# Patient Record
Sex: FEMALE | Race: WHITE | Employment: OTHER | URBAN - METROPOLITAN AREA
[De-identification: names, ages, dates, MRNs, and addresses within clinical notes are randomized per-mention and may not be internally consistent; named-entity substitution may affect disease eponyms.]

---

## 2017-04-10 PROBLEM — M47.817 LUMBOSACRAL SPONDYLOSIS WITHOUT MYELOPATHY: Status: ACTIVE | Noted: 2017-04-10

## 2017-04-10 PROBLEM — M51.379 DEGENERATION OF LUMBAR OR LUMBOSACRAL INTERVERTEBRAL DISC: Status: ACTIVE | Noted: 2017-04-10

## 2017-04-10 PROBLEM — M51.26 DISPLACEMENT OF LUMBAR INTERVERTEBRAL DISC WITHOUT MYELOPATHY: Status: ACTIVE | Noted: 2017-04-10

## 2017-04-10 PROBLEM — M51.379 DEGENERATION OF LUMBAR OR LUMBOSACRAL INTERVERTEBRAL DISC: Chronic | Status: ACTIVE | Noted: 2017-04-10

## 2017-04-10 PROBLEM — M54.16 LUMBAR RADICULOPATHY: Chronic | Status: ACTIVE | Noted: 2017-04-10

## 2017-04-10 PROBLEM — M51.37 DEGENERATION OF LUMBAR OR LUMBOSACRAL INTERVERTEBRAL DISC: Chronic | Status: ACTIVE | Noted: 2017-04-10

## 2017-04-10 PROBLEM — M51.37 DEGENERATION OF LUMBAR OR LUMBOSACRAL INTERVERTEBRAL DISC: Status: ACTIVE | Noted: 2017-04-10

## 2017-04-10 PROBLEM — M47.817 LUMBOSACRAL SPONDYLOSIS WITHOUT MYELOPATHY: Chronic | Status: ACTIVE | Noted: 2017-04-10

## 2017-04-10 PROBLEM — M51.26 DISPLACEMENT OF LUMBAR INTERVERTEBRAL DISC WITHOUT MYELOPATHY: Chronic | Status: ACTIVE | Noted: 2017-04-10

## 2017-04-10 PROBLEM — M54.16 LUMBAR RADICULOPATHY: Status: ACTIVE | Noted: 2017-04-10

## 2022-12-28 DIAGNOSIS — D32.9 MENINGIOMA (HCC): Primary | ICD-10-CM

## 2022-12-30 RX ORDER — QUETIAPINE FUMARATE 25 MG/1
12.5 TABLET, FILM COATED ORAL NIGHTLY
COMMUNITY
Start: 2022-12-12

## 2022-12-30 RX ORDER — METFORMIN HYDROCHLORIDE 500 MG/1
500 TABLET, EXTENDED RELEASE ORAL 2 TIMES DAILY
COMMUNITY
Start: 2022-10-24

## 2022-12-30 RX ORDER — ALBUTEROL SULFATE 90 UG/1
2 AEROSOL, METERED RESPIRATORY (INHALATION) EVERY 4 HOURS PRN
COMMUNITY
Start: 2021-01-11 | End: 2023-01-09

## 2022-12-30 RX ORDER — FLUTICASONE PROPIONATE 50 MCG
2 SPRAY, SUSPENSION (ML) NASAL NIGHTLY
COMMUNITY
Start: 2022-12-05

## 2022-12-30 RX ORDER — FLUTICASONE FUROATE, UMECLIDINIUM BROMIDE AND VILANTEROL TRIFENATATE 100; 62.5; 25 UG/1; UG/1; UG/1
POWDER RESPIRATORY (INHALATION)
COMMUNITY
Start: 2022-11-21

## 2022-12-30 RX ORDER — DOXAZOSIN 2 MG/1
TABLET ORAL
COMMUNITY
Start: 2022-10-21

## 2022-12-30 RX ORDER — LATANOPROST 50 UG/ML
1 SOLUTION/ DROPS OPHTHALMIC NIGHTLY
COMMUNITY

## 2023-01-10 ENCOUNTER — HOSPITAL ENCOUNTER (OUTPATIENT)
Dept: RADIATION ONCOLOGY | Age: 78
Discharge: HOME | End: 2023-01-10
Payer: MEDICARE

## 2023-01-10 ENCOUNTER — ANESTHESIA (OUTPATIENT)
Dept: RADIATION ONCOLOGY | Age: 78
End: 2023-01-10

## 2023-01-10 ENCOUNTER — ANESTHESIA EVENT (OUTPATIENT)
Dept: RADIATION ONCOLOGY | Age: 78
End: 2023-01-10

## 2023-01-10 ENCOUNTER — HOSPITAL ENCOUNTER (OUTPATIENT)
Dept: CT IMAGING | Age: 78
Discharge: HOME | End: 2023-01-10
Payer: MEDICARE

## 2023-01-10 ENCOUNTER — HOSPITAL ENCOUNTER (OUTPATIENT)
Dept: MRI IMAGING | Age: 78
Discharge: HOME | End: 2023-01-10
Payer: MEDICARE

## 2023-01-10 VITALS
BODY MASS INDEX: 30.88 KG/M2 | OXYGEN SATURATION: 97 % | TEMPERATURE: 96.9 F | HEIGHT: 60 IN | SYSTOLIC BLOOD PRESSURE: 116 MMHG | RESPIRATION RATE: 18 BRPM | WEIGHT: 157.3 LBS | HEART RATE: 68 BPM | DIASTOLIC BLOOD PRESSURE: 51 MMHG

## 2023-01-10 LAB
ALBUMIN SERPL-MCNC: 4.2 G/DL (ref 3.4–5)
ANION GAP SERPL CALCULATED.3IONS-SCNC: 13 MMOL/L (ref 3–16)
BUN BLDV-MCNC: 18 MG/DL (ref 7–20)
CALCIUM SERPL-MCNC: 9.8 MG/DL (ref 8.3–10.6)
CHLORIDE BLD-SCNC: 101 MMOL/L (ref 99–110)
CO2: 24 MMOL/L (ref 21–32)
CREAT SERPL-MCNC: 0.7 MG/DL (ref 0.6–1.2)
GFR SERPL CREATININE-BSD FRML MDRD: >60 ML/MIN/{1.73_M2}
GLUCOSE BLD-MCNC: 184 MG/DL (ref 70–99)
GLUCOSE BLD-MCNC: 197 MG/DL (ref 70–99)
PERFORMED ON: ABNORMAL
PHOSPHORUS: 3.1 MG/DL (ref 2.5–4.9)
POTASSIUM SERPL-SCNC: 4.7 MMOL/L (ref 3.5–5.1)
SODIUM BLD-SCNC: 138 MMOL/L (ref 136–145)

## 2023-01-10 PROCEDURE — 2580000003 HC RX 258: Performed by: NEUROLOGICAL SURGERY

## 2023-01-10 PROCEDURE — 36415 COLL VENOUS BLD VENIPUNCTURE: CPT

## 2023-01-10 PROCEDURE — 77371 SRS MULTISOURCE: CPT

## 2023-01-10 PROCEDURE — 77295 3-D RADIOTHERAPY PLAN: CPT

## 2023-01-10 PROCEDURE — A9579 GAD-BASE MR CONTRAST NOS,1ML: HCPCS | Performed by: NEUROLOGICAL SURGERY

## 2023-01-10 PROCEDURE — 77336 RADIATION PHYSICS CONSULT: CPT

## 2023-01-10 PROCEDURE — 2500000003 HC RX 250 WO HCPCS: Performed by: NEUROLOGICAL SURGERY

## 2023-01-10 PROCEDURE — 7100000010 HC PHASE II RECOVERY - FIRST 15 MIN

## 2023-01-10 PROCEDURE — 80069 RENAL FUNCTION PANEL: CPT

## 2023-01-10 PROCEDURE — 3700000001 HC ADD 15 MINUTES (ANESTHESIA)

## 2023-01-10 PROCEDURE — 6360000004 HC RX CONTRAST MEDICATION: Performed by: NEUROLOGICAL SURGERY

## 2023-01-10 PROCEDURE — 70553 MRI BRAIN STEM W/O & W/DYE: CPT

## 2023-01-10 PROCEDURE — 3700000000 HC ANESTHESIA ATTENDED CARE

## 2023-01-10 PROCEDURE — 7100000011 HC PHASE II RECOVERY - ADDTL 15 MIN

## 2023-01-10 PROCEDURE — 77300 RADIATION THERAPY DOSE PLAN: CPT

## 2023-01-10 PROCEDURE — 6370000000 HC RX 637 (ALT 250 FOR IP): Performed by: NEUROLOGICAL SURGERY

## 2023-01-10 PROCEDURE — 77334 RADIATION TREATMENT AID(S): CPT

## 2023-01-10 PROCEDURE — 70470 CT HEAD/BRAIN W/O & W/DYE: CPT

## 2023-01-10 PROCEDURE — 6360000002 HC RX W HCPCS: Performed by: NEUROLOGICAL SURGERY

## 2023-01-10 RX ORDER — BUPIVACAINE HYDROCHLORIDE 5 MG/ML
30 INJECTION, SOLUTION EPIDURAL; INTRACAUDAL ONCE
Status: COMPLETED | OUTPATIENT
Start: 2023-01-10 | End: 2023-01-10

## 2023-01-10 RX ORDER — 0.9 % SODIUM CHLORIDE 0.9 %
500 INTRAVENOUS SOLUTION INTRAVENOUS ONCE
Status: COMPLETED | OUTPATIENT
Start: 2023-01-10 | End: 2023-01-10

## 2023-01-10 RX ORDER — PROPOFOL 10 MG/ML
INJECTION, EMULSION INTRAVENOUS PRN
Status: DISCONTINUED | OUTPATIENT
Start: 2023-01-10 | End: 2023-01-10 | Stop reason: SDUPTHER

## 2023-01-10 RX ORDER — LIDOCAINE HYDROCHLORIDE 10 MG/ML
30 INJECTION, SOLUTION EPIDURAL; INFILTRATION; INTRACAUDAL; PERINEURAL ONCE
Status: COMPLETED | OUTPATIENT
Start: 2023-01-10 | End: 2023-01-10

## 2023-01-10 RX ORDER — LIDOCAINE HYDROCHLORIDE 20 MG/ML
INJECTION, SOLUTION EPIDURAL; INFILTRATION; INTRACAUDAL; PERINEURAL PRN
Status: DISCONTINUED | OUTPATIENT
Start: 2023-01-10 | End: 2023-01-10 | Stop reason: SDUPTHER

## 2023-01-10 RX ORDER — ACETAMINOPHEN 325 MG/1
650 TABLET ORAL EVERY 4 HOURS PRN
Status: DISCONTINUED | OUTPATIENT
Start: 2023-01-10 | End: 2023-01-11 | Stop reason: HOSPADM

## 2023-01-10 RX ORDER — LORAZEPAM 2 MG/ML
1 INJECTION INTRAMUSCULAR EVERY 6 HOURS PRN
Status: DISCONTINUED | OUTPATIENT
Start: 2023-01-10 | End: 2023-01-11 | Stop reason: HOSPADM

## 2023-01-10 RX ORDER — BENZONATATE 100 MG/1
100 CAPSULE ORAL ONCE
Status: COMPLETED | OUTPATIENT
Start: 2023-01-10 | End: 2023-01-10

## 2023-01-10 RX ORDER — DEXAMETHASONE SODIUM PHOSPHATE 4 MG/ML
4 INJECTION, SOLUTION INTRA-ARTICULAR; INTRALESIONAL; INTRAMUSCULAR; INTRAVENOUS; SOFT TISSUE ONCE
Status: COMPLETED | OUTPATIENT
Start: 2023-01-10 | End: 2023-01-10

## 2023-01-10 RX ORDER — SODIUM BICARBONATE 42 MG/ML
1.5 INJECTION, SOLUTION INTRAVENOUS ONCE
Status: COMPLETED | OUTPATIENT
Start: 2023-01-10 | End: 2023-01-10

## 2023-01-10 RX ORDER — ONDANSETRON 2 MG/ML
4 INJECTION INTRAMUSCULAR; INTRAVENOUS ONCE
Status: COMPLETED | OUTPATIENT
Start: 2023-01-10 | End: 2023-01-10

## 2023-01-10 RX ORDER — BACITRACIN, NEOMYCIN, POLYMYXIN B 400; 3.5; 5 [USP'U]/G; MG/G; [USP'U]/G
OINTMENT TOPICAL ONCE
Status: COMPLETED | OUTPATIENT
Start: 2023-01-10 | End: 2023-01-10

## 2023-01-10 RX ORDER — SODIUM CHLORIDE 9 MG/ML
INJECTION, SOLUTION INTRAVENOUS CONTINUOUS PRN
Status: DISCONTINUED | OUTPATIENT
Start: 2023-01-10 | End: 2023-01-10 | Stop reason: SDUPTHER

## 2023-01-10 RX ADMIN — PROPOFOL 30 MG: 10 INJECTION, EMULSION INTRAVENOUS at 07:59

## 2023-01-10 RX ADMIN — LIDOCAINE HYDROCHLORIDE 30 ML: 10 INJECTION, SOLUTION EPIDURAL; INFILTRATION; INTRACAUDAL; PERINEURAL at 08:10

## 2023-01-10 RX ADMIN — LIDOCAINE HYDROCHLORIDE 100 MG: 20 INJECTION, SOLUTION EPIDURAL; INFILTRATION; INTRACAUDAL; PERINEURAL at 07:55

## 2023-01-10 RX ADMIN — PROPOFOL 20 MG: 10 INJECTION, EMULSION INTRAVENOUS at 07:57

## 2023-01-10 RX ADMIN — DEXAMETHASONE SODIUM PHOSPHATE 4 MG: 4 INJECTION, SOLUTION INTRAMUSCULAR; INTRAVENOUS at 08:10

## 2023-01-10 RX ADMIN — PROPOFOL 50 MG: 10 INJECTION, EMULSION INTRAVENOUS at 07:55

## 2023-01-10 RX ADMIN — SODIUM CHLORIDE: 9 INJECTION, SOLUTION INTRAVENOUS at 07:52

## 2023-01-10 RX ADMIN — GADOTERIDOL 15 ML: 279.3 INJECTION, SOLUTION INTRAVENOUS at 10:01

## 2023-01-10 RX ADMIN — ONDANSETRON 4 MG: 2 INJECTION INTRAMUSCULAR; INTRAVENOUS at 06:37

## 2023-01-10 RX ADMIN — SODIUM CHLORIDE 500 ML: 9 INJECTION, SOLUTION INTRAVENOUS at 07:30

## 2023-01-10 RX ADMIN — BENZONATATE 100 MG: 100 CAPSULE ORAL at 11:47

## 2023-01-10 RX ADMIN — SODIUM BICARBONATE 1.5 MEQ: 42 INJECTION, SOLUTION INTRAVENOUS at 08:10

## 2023-01-10 RX ADMIN — BACITRACIN, NEOMYCIN, POLYMYXIN B: 400; 3.5; 5 OINTMENT TOPICAL at 14:03

## 2023-01-10 RX ADMIN — IOPAMIDOL 75 ML: 755 INJECTION, SOLUTION INTRAVENOUS at 09:03

## 2023-01-10 RX ADMIN — PROPOFOL 50 MG: 10 INJECTION, EMULSION INTRAVENOUS at 08:02

## 2023-01-10 RX ADMIN — BUPIVACAINE HYDROCHLORIDE 150 MG: 5 INJECTION, SOLUTION EPIDURAL; INTRACAUDAL; PERINEURAL at 08:10

## 2023-01-10 RX ADMIN — PROPOFOL 50 MG: 10 INJECTION, EMULSION INTRAVENOUS at 08:06

## 2023-01-10 ASSESSMENT — PAIN DESCRIPTION - ORIENTATION: ORIENTATION: ANTERIOR;POSTERIOR

## 2023-01-10 ASSESSMENT — PAIN DESCRIPTION - DESCRIPTORS: DESCRIPTORS: PATIENT UNABLE TO DESCRIBE

## 2023-01-10 ASSESSMENT — PAIN DESCRIPTION - LOCATION: LOCATION: HEAD

## 2023-01-10 NOTE — PROGRESS NOTES
0828  Patient received MAC under the supervision of Dr. Josette Munroe. This RN was present throughout [place] [address] and assumed care from anesthesia for Phase II recovery. The patient is awake and breathing easily. Patient denies pain. See Flow Sheet for vitals and Dionne Score.     Pedro Garrett RN

## 2023-01-10 NOTE — PROGRESS NOTES
1240  Patient brought to [facility] suite and placed on treatment couch. Patient instructed to perform ankle pumps during treatment. AV monitoring in place and verified verbally with patient from console. Continuous SpO2 and pulse monitor visible and monitored by this RN.      Mireille Beavers RN

## 2023-01-10 NOTE — H&P
The patient was seen and examined. There have been no changes in the patient's condition since the history and physical.    I reviewed the benefits, risks, and alternatives and the patient consents to the procedure. We will obtain a CBCT with markers to confirm the location of the left frontal craniotomy vis-a-vis the fixation pins. Gasper Guerrero MD

## 2023-01-10 NOTE — ANESTHESIA POSTPROCEDURE EVALUATION
Department of Anesthesiology  Postprocedure Note    Patient: Florence Hernandez  MRN: [id number]  Birthdate: [birth year]  Date of evaluation: 1/10/2023      Procedure Summary     Date: 01/10/23 Room / Location: [facility]    Anesthesia Start: 0752 Anesthesia Stop: 1570    Procedure: GAMMAKNIFE W ANESTHESIA Diagnosis:     Scheduled Providers:  Responsible Provider: Ashley Mcmullen DO    Anesthesia Type: MAC ASA Status: 3          Anesthesia Type: No value filed.     Dionne Phase I:      Dionne Phase II:        Anesthesia Post Evaluation    Patient location during evaluation: PACU  Patient participation: complete - patient participated  Level of consciousness: awake and alert  Pain score: 0  Airway patency: patent  Nausea & Vomiting: no nausea and no vomiting  Complications: no  Cardiovascular status: hemodynamically stable  Respiratory status: acceptable  Hydration status: stable

## 2023-01-10 NOTE — PROGRESS NOTES
18  Gamma Knife Frame Placement Time Out     1. Patient states name and birthdate correctly? Yes    2. Procedure listed on consent:  G-Frame placement and Gamma Knife radiosurgery for   Left Meningioma    3. Is this the correct procedure? Yes    4. Are the consents signed? Yes    5. Is this a trigeminal neuralgia case? No    -If yes, what side are we treating? N/A    -If yes, is the side marked for laterality? N/A    6. Have films been reviewed today? Yes    7. Has the interim History and Physical form been completed? yes    8. Has the neurosurgeon reviewed the [facility] RN Pre-Procedure Checklist?  Yes    9. FEMALES ONLY: Does the patient [facility] a [facility] [address]? no     -If yes, the result was:  na    10. Are all present in agreement?   Yes    Those present for time out:  Dr. Alphonse Suresh RN, Cecilio Quezada RN

## 2023-01-10 NOTE — PROGRESS NOTES
Nadine 58    1. Patient states name and birthdate correctly? Yes    2. Procedure listed on consent Stereotactic Radiosurgery, Gamma Knife for Left Meningioma    3. Is this the correct procedure? Yes    4. Is this a trigeminal neuralgia case? No    -If yes, what side are we treating? N/A    -If yes, is the side marked for laterality? N/A    5. Has the final radiologist report been reviewed? yes    6. Does the patient require Keppra prior to treatment delivery? yes    7. Does the patient require Ativan prior to treatment delivery? N/A    8. Are all present in agreement? Yes    9.  Those present for time out:  Dr. Yuni Martinez RN, Megan Whitlock RN

## 2023-01-10 NOTE — DISCHARGE INSTRUCTIONS
GAMMA KNIFE POST-PROCEDURE INSTRUCTIONS    You may gently wash your hair and face two days after your procedure. Be gentle and do not rub the pin sites. Pat areas dry. WHAT TO DO IF YOU EXPERIENCE BLEEDING AT THE PIN SITES:  Remove any steri strips or band-aids that may be in place at the site that is bleeding. Using the gauze pads given to you by the [facility] RN, apply direct, one finger pressure continuously for 5 minutes. After 5 minutes inspect the site for bleeding. If the bleeding continues, repeat holding pressure, this time for 10 minutes. After 10 minutes, inspect the site again. If bleeding continues, proceed to the nearest emergency room for placement of a suture (stitch). If you have sutures (stitches) in place, those will dissolve on their own in about 7-10 days. You may shower with them, but keep them clean and dry otherwise. Avoid hair spray, mousse, gels, hair color, permanent solutions, or any other products that could cause irritation until your pin sites heal.  You may notice blood-tinged water when washing your hair. This may be dried blood in your hair from the application of the head frame. Swelling above the eyes may occur within 48 hours of surgery. You may apply cool compresses to your forehead if swelling occurs. Some patients experiences tingling or temporary numbness above the pin sites. You may return to work or school after 24 hours if you feel well enough. You should resume all of your regular activities unless the physician has instructed you otherwise. You may resume your usual diet right away. You have undergone a procedure with sedation. You have been given medicines that affect your judgment or impair your ability to operate heavy machines and automobiles. You may not drive for 24 hours after your procedure. Please see the attached information regarding Sedation.   If you develop any signs of infection (redness, swelling, drainage or irritation at pin sites, fever >101 F for more than 24 hours), call Dr. Stephanie Reyes office at [phone]. If you have received contrast for your CT scan and you are taking Metformin, you must not take your Metformin for the next 48 hours. Follow up with your physician as directed. Please contact Dr. Oliver Nolasco at [phone] with any new vision changes, balance problems, numbness/tingling, or severe headache. If you have an emergent concern and need to be seen by a physician immediately, please go to [facility] emergency room. Steroid taper:  Complete Medrol Dose Pack as prescribed. Continue taking your Keppra as prescribed until 3 days after Medrol dose Pack is complete. On March 15th start taking 1-250mg Keppra 1 time a day for 1 week, last day to take will be January 22nd. Take Blood Glucose levels 3 times a day while taking Medrol Dose Pack. If levels are over 200 please follow-up with Primary Care Physician. If you have any questions during regular business hours, call the [facility] nurse at [phone]. For questions during off-business hours and on weekends, contact Dr. Stephanie Reyes office directly at [phone]. For Jose Davis or Robina call [phone].

## 2023-01-10 NOTE — PROGRESS NOTES
64  Gamma Knife RN Pre-Procedure Checklist     1. Has the patient ever had any surgery on the head or neck? Yes    10/18/2011 by Dr. Caron Lei    -If yes, is the surgery site marked? yes    2. Has the patient ever received radiation treatment to the head or neck? No      -If yes, is the treatment summary and/or disk of treatment plan available? N/A      -If the patient has had previous Gamma Knife radiosurgery has the most recent imaging been reviewed in the Gamma Plan? N/A      3. Has the patient received chemotherapy or immunotherapy in the past month? No      -If yes, list the agent and date of last treatment. N/A    4. Is patient [de-identified] or older? No      -If yes, using all aluminum pins? Yes      5. List the procedure-specific medications taken by the patient this morning:   Keppra 250mg   Medrol Dose    6. What is the patients GFR? 88    -For GFR 0-30 => no contrast at MRI    -For GFR 31-59 => single dose contrast at MRI    -For GFR >60 => double dose contrast at MRI    7. IF FEMALE: Does the patient require a pregnancy test per [address]?   no   -If yes, the result is: na    8. What is the patient's baseline CO2? 30    9.   [facility]

## 2023-01-10 NOTE — OP NOTE
1 [facility]  PATIENT NAME:   Vijay Moreno   MR #:  [id number]  YOB: 1945   ACCOUNT #:  [de-identified]  SURGEON:  Roseanne Jones MD   ADMIT DATE:  1/10/2023  SERVICE:  Neurosurgery  DICTATED BY: Roseanne Jones MD   SURGERY DATE:  1/10/2023           OPERATIVE REPORT       PREOPERATIVE DIAGNOSIS:     1. Left clinoidal meningioma     POSTOPERATIVE DIAGNOSIS:     1. Same    PROCEDURE(S) PERFORMED:    1. Placement of stereotactic head frame   2. Gamma Knife radiosurgery for left clinoidal meningioma    NEUROSURGEON: Roseanne Jones MD       RADIATION ONCOLOGIST: Kelli Aquino MD    ANESTHESIA: Moderate sedation    COMPLICATIONS: None    INDICATIONS: This is a 76-year-old woman with a left clinoidal meningioma (WHO I, Ki-67 1-2%) who underwent craniotomy and radical resection on 10/18/11 by Dr. Esau Simpson. The patient has been followed expectantly and a recent MRI scan showed enlargement of the tumor remnant. We discussed various treatment options but ultimately recommended Gamma Knife radiosurgery. The patient was aware of the potential benefits in terms of tumor control and the possible risks including (but not limited to): pin site bleeding/swelling/infection, visual loss (1%), hormone disturbance (25%), brainstem swelling, seizures, bleeding, new neurological symptoms, and/or radiation injury of the brain. PERFORMANCE STATUS: 80%    SYSTEMIC DISEASE: Not applicable    DETAILS OF PROCEDURE: The four pin sites were cleaned with Chloraprep and injected with a mixture of Lidocaine 1%, Marcaine 0.5%, and sodium bicarbonate. The stereotactic head frame was secured with titanium screws. The patient underwent a stereotactic MRI scan with contrast. These images were sent over the network to the [address] [place] [address]. The targets and critical structures were contoured.  An optimal treatment plan was developed by the neurosurgeon, radiation oncologist, and medical physicist. All critical structure constraints were fulfilled. The patient then underwent Gamma Knife radiosurgery using the following parameters:    Target Size Volume Shape Shots Dose Isodose Lt OT Lt OT Lt OT    cm cc   Gy % Max Point 0.001 cc 0.005   Left clinoidal 2.20 1.22 Oval 12 13 50 11.9 Gy 9.8 Gy 9.1 Gy     The left clinoidal meningioma is complex based on location within 5 mm of the left optic nerve. The patient tolerated the procedure without difficulty and the stereotactic frame was removed uneventfully. The patient was instructed on pin site care and medications.     SPECIMENS REMOVED: None    ESTIMATED BLOOD LOSS: None    TOTAL TIME SPENT WITH PATIENT: In conformance with CMS regulations, I affirm that I was present for the entire neurosurgical portion of the procedure including stereotactic frame placement, target definition, development of the treatment plan, treatment delivery, and stereotactic frame removal.     Asif Glynn MD

## 2023-01-10 NOTE — PROGRESS NOTES
1345  After Gamma Knife procedure, the G-frame was removed by Leona Trotter RN and Alma Delia Bustamante RN and fixation pin sites were observed for bleeding. None was noted. Pin sites were cleaned with alcohol and povidone-iodine. Dr. Hillary Hayes then placed sutures at all four pin sites. 1400  Patient met Phase II discharge criteria. Baseline neurological status unchanged. See discharge Dionne score and vitals. 1415  Discharge instructions were reviewed with patient and daughter who verbalized understanding using teach back method. A written copy of the instructions along with a steroid taper calendar was also given. Patient has follow up appointments scheduled. 1434  Patient was accompanied to transportation by this RN.     Leona Trotter RN

## 2023-01-10 NOTE — PROGRESS NOTES
5631  Patient arrived to [address] [place] [address] Way alert and oriented x 4 with  and daughter. Patient is neurologically intact. PIV established without difficulty. Initial vitals WNL, and patient denies pain.         Aster Riddle RN

## 2023-01-10 NOTE — ANESTHESIA PRE PROCEDURE
Department of Anesthesiology  Preprocedure Note       Name:  Krystina Mancini   Age:  65 y.o.  :  1945                                          MRN:  [id number]         Date:  1/10/2023      Surgeon: * No surgeons listed *    Procedure: * No procedures listed *    Medications prior to admission:   Prior to Admission medications    Medication Sig Start Date End Date Taking? Authorizing Provider   fluticasone (FLONASE) 50 MCG/ACT nasal spray 2 sprays by Nasal route nightly 22  Yes Historical Provider, MD   metFORMIN (GLUCOPHAGE-XR) 500 MG extended release tablet Take 500 mg by mouth 2 times daily 10/24/22  Yes Historical Provider, MD   QUEtiapine (SEROQUEL) 25 MG tablet 12.5 mg nightly 22  Yes Historical Provider, MD   doxazosin (CARDURA) 2 MG tablet  10/21/22   Historical Provider, MD Tayler Kingston [phone] MCG/ACT AEPB inhaler INHALE 1 PUFF DAILY 22   Historical Provider, MD   latanoprost (XALATAN) 0.005 % ophthalmic solution Apply 1 drop to eye nightly    Historical Provider, MD   traMADol (ULTRAM) 50 MG tablet Take 1 tablet by mouth 2 times daily as needed for Pain 10/23/17   Stefani Regan MD   traMADol (ULTRAM) 50 MG tablet Take 1 tablet by mouth 2 times daily as needed for Pain 17   Stefani Regan MD   diltiazem (CARDIZEM CD) 180 MG ER capsule Take 360 mg by mouth daily. Historical Provider, MD   famotidine (PEPCID) 20 MG tablet Take 20 mg by mouth 2 times daily. Historical Provider, MD   rosuvastatin (CRESTOR) 10 MG tablet Take 10 mg by mouth daily. Historical Provider, MD   terazosin (HYTRIN) 1 MG capsule Take 1 mg by mouth nightly.       Historical Provider, MD       Current medications:    Current Outpatient Medications   Medication Sig Dispense Refill    fluticasone (FLONASE) 50 MCG/ACT nasal spray 2 sprays by Nasal route nightly      metFORMIN (GLUCOPHAGE-XR) 500 MG extended release tablet Take 500 mg by mouth 2 times daily      QUEtiapine (SEROQUEL) 25 MG tablet 12.5 mg nightly      doxazosin (CARDURA) 2 MG tablet       TRELEGY ELLIPTA 100-62.5-25 MCG/ACT AEPB inhaler INHALE 1 PUFF DAILY      latanoprost (XALATAN) 0.005 % ophthalmic solution Apply 1 drop to eye nightly      traMADol (ULTRAM) 50 MG tablet Take 1 tablet by mouth 2 times daily as needed for Pain 60 tablet 0    traMADol (ULTRAM) 50 MG tablet Take 1 tablet by mouth 2 times daily as needed for Pain 60 tablet 0    diltiazem (CARDIZEM CD) 180 MG ER capsule Take 360 mg by mouth daily.  famotidine (PEPCID) 20 MG tablet Take 20 mg by mouth 2 times daily.  rosuvastatin (CRESTOR) 10 MG tablet Take 10 mg by mouth daily.  terazosin (HYTRIN) 1 MG capsule Take 1 mg by mouth nightly. Current Facility-Administered Medications   Medication Dose Route Frequency Provider Last Rate Last Admin    bupivacaine (PF) (MARCAINE) 0.5 % injection 150 mg  30 mL IntraDERmal Once Viva Blinks, MD        0.9 % sodium chloride bolus  500 mL IntraVENous Once Viva Blinks, MD        lidocaine PF 1 % injection 30 mL  30 mL IntraDERmal Once Viva Blinks, MD        dexamethasone (DECADRON) injection 4 mg  4 mg IntraDERmal Once Viva Blinks, MD        sodium bicarbonate 4.2 % injection 1.5 mEq  1.5 mEq IntraDERmal Once Viva Blinks, MD        neomycin-bacitracin-polymyxin (NEOSPORIN) ointment   Topical Once Viva Blinks, MD        acetaminophen (TYLENOL) tablet 650 mg  650 mg Oral Q4H PRN Viva Blinks, MD        LORazepam (ATIVAN) injection 1 mg  1 mg IntraVENous Q6H PRN Viva Blinks, MD           Allergies:     Allergies   Allergen Reactions    Latex Hives     Causes skin to break out    Adhesive Tape Other (See Comments)     Causes skin to break out    Estradiol     Niacin Rash       Problem List:    Patient Active Problem List   Diagnosis Code    Degeneration of lumbar or lumbosacral intervertebral disc M51.37    Displacement of lumbar intervertebral disc without myelopathy M51.26    Lumbar radiculopathy M54.16    Lumbosacral spondylosis without myelopathy M47.817       Past Medical History:        Diagnosis Date    Asthma     Cancer ([facility] 01/01/2001    bladder    Diabetes mellitus ([facility]     no medications    GERD (gastroesophageal reflux disease)     Glaucoma     HLD (hyperlipidemia)     Hypertension     Meningioma ([facility]        Past Surgical History:        Procedure Laterality Date    BACK SURGERY      c spine    CRANIOTOMY Left 10/13/2011    left frontotemporal    EYE SURGERY      cateracts both eyes    HYSTERECTOMY (CERVIX STATUS UNKNOWN)      ORTHOPEDIC SURGERY      torn right meniscus    TOTAL HIP ARTHROPLASTY Right 2021       Social History:    Social History     Tobacco Use    Smoking status: Never    Smokeless tobacco: Never   Substance Use Topics    Alcohol use: No                                Counseling given: Not Answered      Vital Signs (Current):   Vitals:    01/10/23 0630   BP: (!) 124/54   Pulse: 77   Resp: 20   Temp: 96.9 °F (36.1 °C)   TempSrc: Infrared   Weight: 157 lb 4.8 oz (71.4 kg)   Height: 5' (1.524 m)                                              BP Readings from Last 3 Encounters:   01/10/23 (!) 124/54   08/08/17 114/67   05/23/17 109/71       NPO Status: Time of last liquid consumption: 2300                        Time of last solid consumption: 2300                                                      BMI:   Wt Readings from Last 3 Encounters:   01/10/23 157 lb 4.8 oz (71.4 kg)   08/08/17 160 lb (72.6 kg)   05/23/17 160 lb 3.2 oz (72.7 kg)     Body mass index is 30.72 kg/m².     CBC: No results found for: WBC, RBC, HGB, HCT, MCV, RDW, PLT    CMP:   Lab Results   Component Value Date/Time     01/10/2023 06:21 AM    K 4.7 01/10/2023 06:21 AM     01/10/2023 06:21 AM    CO2 24 01/10/2023 06:21 AM    BUN 18 01/10/2023 06:21 AM    CREATININE 0.7 01/10/2023 06:21 AM    LABGLOM >60 01/10/2023 06:21 AM GLUCOSE 197 01/10/2023 06:21 AM    CALCIUM 9.8 01/10/2023 06:21 AM       POC Tests:   Recent Labs     01/10/23  0621   POCGLU 184*       Coags: No results found for: PROTIME, INR, APTT    HCG (If Applicable): No results found for: PREGTESTUR, PREGSERUM, HCG, HCGQUANT     ABGs: No results found for: PHART, PO2ART, PWJ8JJZ, DYV4XWR, BEART, H7KESXVP     Type & Screen (If Applicable):  No results found for: LABABO, LABRH    Drug/Infectious Status (If Applicable):  No results found for: HIV, HEPCAB    COVID-19 Screening (If Applicable): No results found for: COVID19        Anesthesia Evaluation  Patient summary reviewed and Nursing notes reviewed no history of anesthetic complications:   Airway: Mallampati: II  TM distance: >3 FB   Neck ROM: full  Mouth opening: > = 3 FB   Dental:    (+) poor dentition      Pulmonary: breath sounds clear to auscultation  (+) asthma:                            Cardiovascular:  Exercise tolerance: good (>4 METS),   (+) hypertension: moderate,     (-) past MI    NYHA Classification: II    Rhythm: regular  Rate: normal           Beta Blocker:  Not on Beta Blocker         Neuro/Psych:                ROS comment: Left meningioma   GI/Hepatic/Renal:   (+) GERD: well controlled,           Endo/Other:    (+) DiabetesType II DM, , malignancy/cancer (hx of bladder ca ). Abdominal:   (+) obese,           Vascular: negative vascular ROS. Other Findings:           Anesthesia Plan      MAC     ASA 3       Induction: intravenous. MIPS: Prophylactic antiemetics administered. Anesthetic plan and risks discussed with patient. Plan discussed with CRNA.     Attending anesthesiologist reviewed and agrees with Preprocedure content                Luis Rapp DO   1/10/2023

## 2023-01-11 ENCOUNTER — POST-OP TELEPHONE (OUTPATIENT)
Dept: RADIATION ONCOLOGY | Age: 78
End: 2023-01-11

## 2023-01-11 NOTE — PROGRESS NOTES
Spoke to patient POD #1 from [facility] radiosurgery. Patient denies bleeding, swelling, headache, or fever. Reinforced all post-procedure instructions and reviewed medrol dose pack. Reviewed with patient that the follow-up phone call is scheduled for 1/25/23. Patient verbalized understanding to call with any new neurological symptoms. Encouraged to call with any questions or concerns.   Rudolph Foreman RN

## 2023-01-24 ENCOUNTER — POST-OP TELEPHONE (OUTPATIENT)
Dept: RADIATION ONCOLOGY | Age: 78
End: 2023-01-24

## 2023-08-10 ENCOUNTER — HOSPITAL ENCOUNTER (OUTPATIENT)
Dept: MRI IMAGING | Age: 78
Discharge: HOME | End: 2023-08-10
Attending: NEUROLOGICAL SURGERY
Payer: MEDICARE

## 2023-08-10 DIAGNOSIS — D32.9 MENINGIOMA (HCC): ICD-10-CM

## 2023-08-10 PROCEDURE — A9579 GAD-BASE MR CONTRAST NOS,1ML: HCPCS | Performed by: NEUROLOGICAL SURGERY

## 2023-08-10 PROCEDURE — 6360000004 HC RX CONTRAST MEDICATION: Performed by: NEUROLOGICAL SURGERY

## 2023-08-10 PROCEDURE — 70553 MRI BRAIN STEM W/O & W/DYE: CPT

## 2023-08-10 RX ADMIN — GADOTERIDOL 16 ML: 279.3 INJECTION, SOLUTION INTRAVENOUS at 16:47

## 2024-02-16 ENCOUNTER — HOSPITAL ENCOUNTER (OUTPATIENT)
Dept: MRI IMAGING | Age: 79
Discharge: HOME | End: 2024-02-16
Attending: NEUROLOGICAL SURGERY
Payer: MEDICARE

## 2024-02-16 DIAGNOSIS — D32.9 MENINGIOMA (HCC): ICD-10-CM

## 2024-02-16 PROCEDURE — A9576 INJ PROHANCE MULTIPACK: HCPCS | Performed by: NEUROLOGICAL SURGERY

## 2024-02-16 PROCEDURE — 6360000004 HC RX CONTRAST MEDICATION: Performed by: NEUROLOGICAL SURGERY

## 2024-02-16 PROCEDURE — 70553 MRI BRAIN STEM W/O & W/DYE: CPT

## 2024-02-16 RX ADMIN — GADOTERIDOL 15 ML: 279.3 INJECTION, SOLUTION INTRAVENOUS at 13:55

## 2024-04-15 RX ORDER — ALBUTEROL SULFATE 2.5 MG/3ML
2.5 SOLUTION RESPIRATORY (INHALATION) EVERY 6 HOURS PRN
COMMUNITY

## 2024-04-15 RX ORDER — M-VIT,TX,IRON,MINS/CALC/FOLIC 27MG-0.4MG
1 TABLET ORAL DAILY
COMMUNITY

## 2024-04-22 ENCOUNTER — HOSPITAL ENCOUNTER (OUTPATIENT)
Dept: RADIATION ONCOLOGY | Age: 79
Discharge: HOME | End: 2024-04-22

## 2024-04-23 ENCOUNTER — PRE-PROCEDURE TELEPHONE (OUTPATIENT)
Dept: RADIATION ONCOLOGY | Age: 79
End: 2024-04-23

## 2024-05-02 ENCOUNTER — PRE-PROCEDURE TELEPHONE (OUTPATIENT)
Dept: RADIATION ONCOLOGY | Age: 79
End: 2024-05-02

## 2024-05-02 NOTE — PROGRESS NOTES
INSTRUCTIONS FOR THE DAY OF GAMMA KNIFE PROCEDURE AT [facility]    1.  Arrive at 6:00 a.m. to register.  2.  DO NOT eat or drink anything after midnight the night before your procedure.  3.  Take all of your usual morning medications the day of the procedure with just a sip of water including Keppra, deamethasone, Protonix, and Ativan.   4.  If you are on any blood thinners (Coumadin, Xarelto, Aspirin, Lovenox), you MAY TAKE those medication.  There is no need to stop these medications for your procedure.  5.  If you need pain medication during the night before or the morning of your procedure, you may take them with a sip of water.    6.  Bring a current list of all of your medications with you.  7.  Do not wear any make-up.  8.  Wear comfortable, loose-fitting clothing.  9.  Do not wear jewelry, belts, or any clothing that contains metal.  10.  Visitors will be limited to 1 per patient.    YOU MUST HAVE SOMEONE DRIVE YOU HOME AFTER YOUR PROCEDURE.    Reviewed all pre-procedure instructions with patient/family member via telephone.  Answered all questions.    Heaven Kruger RN

## 2024-05-06 ENCOUNTER — ANESTHESIA (OUTPATIENT)
Dept: RADIATION ONCOLOGY | Age: 79
End: 2024-05-06
Payer: MEDICARE

## 2024-05-06 ENCOUNTER — HOSPITAL ENCOUNTER (OUTPATIENT)
Dept: MRI IMAGING | Age: 79
Discharge: HOME | End: 2024-05-06
Payer: MEDICARE

## 2024-05-06 ENCOUNTER — HOSPITAL ENCOUNTER (OUTPATIENT)
Dept: RADIATION ONCOLOGY | Age: 79
Discharge: HOME | End: 2024-05-06
Payer: MEDICARE

## 2024-05-06 ENCOUNTER — ANESTHESIA EVENT (OUTPATIENT)
Dept: RADIATION ONCOLOGY | Age: 79
End: 2024-05-06
Payer: MEDICARE

## 2024-05-06 VITALS
HEART RATE: 57 BPM | WEIGHT: 142 LBS | BODY MASS INDEX: 27.88 KG/M2 | OXYGEN SATURATION: 97 % | DIASTOLIC BLOOD PRESSURE: 46 MMHG | HEIGHT: 60 IN | SYSTOLIC BLOOD PRESSURE: 118 MMHG | TEMPERATURE: 97.3 F | RESPIRATION RATE: 15 BRPM

## 2024-05-06 DIAGNOSIS — D32.9 MENINGIOMA (HCC): ICD-10-CM

## 2024-05-06 LAB
GLUCOSE BLD-MCNC: 162 MG/DL (ref 70–99)
PERFORMED ON: ABNORMAL

## 2024-05-06 PROCEDURE — 6360000004 HC RX CONTRAST MEDICATION: Performed by: NEUROLOGICAL SURGERY

## 2024-05-06 PROCEDURE — 77295 3-D RADIOTHERAPY PLAN: CPT

## 2024-05-06 PROCEDURE — 6360000002 HC RX W HCPCS: Performed by: NEUROLOGICAL SURGERY

## 2024-05-06 PROCEDURE — 2500000003 HC RX 250 WO HCPCS: Performed by: NEUROLOGICAL SURGERY

## 2024-05-06 PROCEDURE — 7100000011 HC PHASE II RECOVERY - ADDTL 15 MIN

## 2024-05-06 PROCEDURE — 77334 RADIATION TREATMENT AID(S): CPT

## 2024-05-06 PROCEDURE — A9576 INJ PROHANCE MULTIPACK: HCPCS | Performed by: NEUROLOGICAL SURGERY

## 2024-05-06 PROCEDURE — 2580000003 HC RX 258: Performed by: NURSE ANESTHETIST, CERTIFIED REGISTERED

## 2024-05-06 PROCEDURE — 3700000000 HC ANESTHESIA ATTENDED CARE: Performed by: ANESTHESIOLOGY

## 2024-05-06 PROCEDURE — 2500000003 HC RX 250 WO HCPCS: Performed by: NURSE ANESTHETIST, CERTIFIED REGISTERED

## 2024-05-06 PROCEDURE — 7100000010 HC PHASE II RECOVERY - FIRST 15 MIN

## 2024-05-06 PROCEDURE — 77300 RADIATION THERAPY DOSE PLAN: CPT

## 2024-05-06 PROCEDURE — 70553 MRI BRAIN STEM W/O & W/DYE: CPT

## 2024-05-06 PROCEDURE — 6360000002 HC RX W HCPCS: Performed by: NURSE ANESTHETIST, CERTIFIED REGISTERED

## 2024-05-06 PROCEDURE — 77371 SRS MULTISOURCE: CPT

## 2024-05-06 PROCEDURE — 6370000000 HC RX 637 (ALT 250 FOR IP): Performed by: NEUROLOGICAL SURGERY

## 2024-05-06 RX ORDER — BUPIVACAINE HYDROCHLORIDE 5 MG/ML
30 INJECTION, SOLUTION EPIDURAL; INTRACAUDAL ONCE
Status: COMPLETED | OUTPATIENT
Start: 2024-05-06 | End: 2024-05-06

## 2024-05-06 RX ORDER — ONDANSETRON 2 MG/ML
4 INJECTION INTRAMUSCULAR; INTRAVENOUS EVERY 6 HOURS PRN
Status: DISCONTINUED | OUTPATIENT
Start: 2024-05-06 | End: 2024-05-07 | Stop reason: HOSPADM

## 2024-05-06 RX ORDER — PROPOFOL 10 MG/ML
INJECTION, EMULSION INTRAVENOUS PRN
Status: DISCONTINUED | OUTPATIENT
Start: 2024-05-06 | End: 2024-05-06 | Stop reason: SDUPTHER

## 2024-05-06 RX ORDER — SODIUM CHLORIDE, SODIUM LACTATE, POTASSIUM CHLORIDE, CALCIUM CHLORIDE 600; 310; 30; 20 MG/100ML; MG/100ML; MG/100ML; MG/100ML
INJECTION, SOLUTION INTRAVENOUS CONTINUOUS PRN
Status: DISCONTINUED | OUTPATIENT
Start: 2024-05-06 | End: 2024-05-06 | Stop reason: SDUPTHER

## 2024-05-06 RX ORDER — LEVETIRACETAM 500 MG/1
500 TABLET ORAL 2 TIMES DAILY
COMMUNITY

## 2024-05-06 RX ORDER — IBUPROFEN 200 MG
TABLET ORAL 2 TIMES DAILY
Status: DISCONTINUED | OUTPATIENT
Start: 2024-05-06 | End: 2024-05-07 | Stop reason: HOSPADM

## 2024-05-06 RX ORDER — DEXAMETHASONE 4 MG/1
2 TABLET ORAL 2 TIMES DAILY WITH MEALS
COMMUNITY

## 2024-05-06 RX ORDER — SODIUM BICARBONATE 42 MG/ML
1.5 INJECTION, SOLUTION INTRAVENOUS ONCE
Status: COMPLETED | OUTPATIENT
Start: 2024-05-06 | End: 2024-05-06

## 2024-05-06 RX ORDER — ACETAMINOPHEN 325 MG/1
650 TABLET ORAL EVERY 4 HOURS PRN
Status: DISCONTINUED | OUTPATIENT
Start: 2024-05-06 | End: 2024-05-07 | Stop reason: HOSPADM

## 2024-05-06 RX ORDER — PIMAVANSERIN TARTRATE 34 MG/1
34 CAPSULE ORAL 2 TIMES DAILY
COMMUNITY

## 2024-05-06 RX ORDER — PANTOPRAZOLE SODIUM 40 MG/1
40 TABLET, DELAYED RELEASE ORAL DAILY
COMMUNITY

## 2024-05-06 RX ORDER — LIDOCAINE HYDROCHLORIDE 20 MG/ML
INJECTION, SOLUTION INFILTRATION; PERINEURAL PRN
Status: DISCONTINUED | OUTPATIENT
Start: 2024-05-06 | End: 2024-05-06 | Stop reason: SDUPTHER

## 2024-05-06 RX ORDER — 0.9 % SODIUM CHLORIDE 0.9 %
500 INTRAVENOUS SOLUTION INTRAVENOUS ONCE
Status: DISCONTINUED | OUTPATIENT
Start: 2024-05-06 | End: 2024-05-07 | Stop reason: HOSPADM

## 2024-05-06 RX ORDER — DEXAMETHASONE SODIUM PHOSPHATE 4 MG/ML
4 INJECTION, SOLUTION INTRA-ARTICULAR; INTRALESIONAL; INTRAMUSCULAR; INTRAVENOUS; SOFT TISSUE ONCE
Status: COMPLETED | OUTPATIENT
Start: 2024-05-06 | End: 2024-05-06

## 2024-05-06 RX ORDER — LORAZEPAM 0.5 MG/1
0.5 TABLET ORAL 3 TIMES DAILY
COMMUNITY

## 2024-05-06 RX ADMIN — ONDANSETRON 4 MG: 2 INJECTION INTRAMUSCULAR; INTRAVENOUS at 09:06

## 2024-05-06 RX ADMIN — BUPIVACAINE HYDROCHLORIDE 150 MG: 5 INJECTION, SOLUTION EPIDURAL; INTRACAUDAL; PERINEURAL at 09:14

## 2024-05-06 RX ADMIN — SODIUM BICARBONATE 1.5 MEQ: 42 INJECTION, SOLUTION INTRAVENOUS at 09:14

## 2024-05-06 RX ADMIN — ACETAMINOPHEN 650 MG: 325 TABLET ORAL at 13:08

## 2024-05-06 RX ADMIN — SODIUM CHLORIDE, SODIUM LACTATE, POTASSIUM CHLORIDE, AND CALCIUM CHLORIDE: .6; .31; .03; .02 INJECTION, SOLUTION INTRAVENOUS at 09:16

## 2024-05-06 RX ADMIN — DEXAMETHASONE SODIUM PHOSPHATE 4 MG: 4 INJECTION INTRA-ARTICULAR; INTRALESIONAL; INTRAMUSCULAR; INTRAVENOUS; SOFT TISSUE at 09:14

## 2024-05-06 RX ADMIN — GADOTERIDOL 14 ML: 279.3 INJECTION, SOLUTION INTRAVENOUS at 06:52

## 2024-05-06 RX ADMIN — LIDOCAINE HYDROCHLORIDE 50 MG: 20 INJECTION, SOLUTION INFILTRATION; PERINEURAL at 09:18

## 2024-05-06 RX ADMIN — PROPOFOL 20 MG: 10 INJECTION, EMULSION INTRAVENOUS at 09:22

## 2024-05-06 RX ADMIN — PROPOFOL 40 MG: 10 INJECTION, EMULSION INTRAVENOUS at 09:18

## 2024-05-06 RX ADMIN — LIDOCAINE HYDROCHLORIDE 30 ML: 10 INJECTION, SOLUTION EPIDURAL; INFILTRATION; INTRACAUDAL; PERINEURAL at 09:14

## 2024-05-06 ASSESSMENT — PAIN SCALES - GENERAL: PAINLEVEL_OUTOF10: 2

## 2024-05-06 ASSESSMENT — PAIN DESCRIPTION - DESCRIPTORS: DESCRIPTORS: ACHING

## 2024-05-06 ASSESSMENT — PAIN DESCRIPTION - ORIENTATION: ORIENTATION: ANTERIOR;POSTERIOR

## 2024-05-06 ASSESSMENT — PAIN DESCRIPTION - LOCATION: LOCATION: HEAD

## 2024-05-06 NOTE — PROGRESS NOTES
Patient arrived to Gamma Knife department alert and oriented x 3 with spouse.   Patient is neurologically intact.   PIV established without difficulty.  Initial vitals WNL, and patient denies pain.    KPS: 80    ECO    mFI-5: 2    Alma Delia Madera RN      Gamma Knife RN Pre-Procedure Checklist     1. Has the patient ever had any surgery on the head or neck?  Yes, craniotomy 10/18/2011    -If yes, is the surgery site marked?  yes    2. Has the patient ever received radiation treatment to the head or neck? Yes, [facility] 1/10/2023      -If yes, is the treatment summary and/or disk of treatment plan available? Yes      -If the patient has had previous Gamma Knife radiosurgery has the most recent imaging been reviewed in the Gamma Plan? Yes, Dr. De Santiago 2024      3. Has the patient received chemotherapy or immunotherapy in the past month? No      -If yes, list the agent and date of last treatment.  N/A    4. Is patient 80 or older? Yes      -If yes, using all aluminum pins? Yes    5. List the procedure-specific medications taken by the patient this morning:   -Dexamethasone 2 mg   -Keppra 500 mg   -Protonix 40 mg   -Ativan 0.5 mg    6. What is the patient’s GFR? 56    -For GFR 0-30 => no contrast at MRI    -For GFR 31-59 => single dose contrast at MRI    -For GFR >60 => double dose contrast at MRI    7. IF FEMALE: Does the patient require a pregnancy test per [facility] policy?   N/A   -If yes, the result is: na    8. What is the patient's baseline CO2? 31

## 2024-05-06 NOTE — PROGRESS NOTES
CERTIFICATE OF RETURN TO work    December 7, 2018      Re:   Jose Guadalupe Vo  [address]  [facility] [id number]                        This is to certify that Jose Guadalupe Vo was seen on 12/7/2018 and can return to work 12/8/2018.    RESTRICTIONS: none.        SIGNATURE:___________________________________________,   12/7/2018      SRINI Lemus           [address]  [address] [place] [address]  [phone]   Gamma Knife Frame Placement Time Out     1.  Patient states name and birthdate correctly? Yes    2. Procedure listed on consent:  G-Frame placement and Gamma Knife radiosurgery for left posterior frontal falcine meningioma    3.  Is this the correct procedure?  Yes    4.  Are the consents signed?  Yes    5. Is this a trigeminal neuralgia case? No    -If yes, what side are we treating? N/A    -If yes, is the side marked for laterality?  N/A    6.  Have films been reviewed today?  Yes    7.  Has the interim History and Physical form been completed? yes    8.  Has the neurosurgeon reviewed the Gamma Knife RN Pre-Procedure Checklist?  Yes    9.  FEMALES ONLY: Does the patient require a pregnancy test per [facility] policy? no     -If yes, the result was:  na    10.  Are all present in agreement?  Yes    Those present for time out:  Alma Delia Madera, REX, Dr. De Santiago, Erik You, TOMI Madera, RN

## 2024-05-06 NOTE — H&P
The patient was seen and examined. There have been no changes in the patient's condition since the history and physical.    I reviewed the benefits, risks, and alternatives and the patient consents to the procedure.    Cody De Santiago MD

## 2024-05-06 NOTE — DISCHARGE INSTRUCTIONS
GAMMA KNIFE POST-PROCEDURE INSTRUCTIONS    You may gently wash your hair and face two days after your procedure.  Be gentle and do not rub the pin sites.  Pat areas dry.  WHAT TO DO IF YOU EXPERIENCE BLEEDING AT THE PIN SITES:  Remove any steri strips or band-aids that may be in place at the site that is bleeding.  Using the gauze pads given to you by the Gamma Knife RN, apply direct, one finger pressure continuously for 5 minutes.  After 5 minutes inspect the site for bleeding.  If the bleeding continues, repeat holding pressure, this time for 10 minutes.  After 10 minutes, inspect the site again.  If bleeding continues, proceed to the nearest emergency room for placement of a suture (stitch).   If you have sutures (stitches) in place, those will dissolve on their own in about 2-3 weeks.  You may shower with them, but keep them clean and dry otherwise.  Avoid hair spray, mousse, gels, hair color, permanent solutions, or any other products that could cause irritation until your pin sites heal.  You may notice blood-tinged water when washing your hair.  This may be dried blood in your hair from the application of the head frame.  Swelling above the eyes may occur within 48 hours of surgery.    You may apply cool compresses to your forehead if swelling occurs.  Some patients experiences tingling or temporary numbness above the pin sites.  You may return to work or school after 24 hours if you feel well enough.  You should resume all of your regular activities unless the physician has instructed you otherwise.  You may resume your usual diet right away.  You have undergone a procedure with sedation. You have been given medicines that affect your judgment or impair your ability to operate heavy machines and automobiles. You may not drive for 24 hours after your procedure.  Please see the attached information regarding Sedation.  If you develop any signs of infection (redness, swelling, drainage or irritation at pin

## 2024-05-06 NOTE — PROGRESS NOTES
Patient brought to Gamma Knife suite and placed on treatment couch. Sequential Compression Devices placed on BLE's per [facility] Gamma Knife VTE Protocol. AV monitoring in place and verified verbally with patient from console. Continuous SpO2 and pulse monitor visible and monitored by this RN.    Alma Delia Madera RN

## 2024-05-06 NOTE — ANESTHESIA POSTPROCEDURE EVALUATION
Department of Anesthesiology  Postprocedure Note    Patient: Elma Mcconnell  MRN: [id number]  YOB: 1945  Date of evaluation: 5/6/2024    Procedure Summary       Date: 05/06/24 Room / Location: [facility] Gamma Knife    Anesthesia Start: 0916 Anesthesia Stop: 0928    Procedure: GAMMAKNIFE W ANESTHESIA Diagnosis:     Scheduled Providers: Vick Martinez MD Responsible Provider: Vick Martinez MD    Anesthesia Type: MAC ASA Status: 3            Anesthesia Type: No value filed.    Dionne Phase I: Dionne Score: 10    Dionne Phase II:      Anesthesia Post Evaluation    Patient location during evaluation: PACU  Patient participation: complete - patient participated  Level of consciousness: awake  Airway patency: patent  Nausea & Vomiting: no nausea and no vomiting  Cardiovascular status: blood pressure returned to baseline and hemodynamically stable  Respiratory status: acceptable  Hydration status: euvolemic  Multimodal analgesia pain management approach  Pain management: adequate    No notable events documented.

## 2024-05-06 NOTE — PROGRESS NOTES
After Gamma Knife procedure, the G-frame was removed by REX Flannery and REX Collado and fixation pin sites were observed for bleeding. None was noted.  Pin sites were cleaned with alcohol and povidone-iodine.  Dr. De Santiago then placed sutures at all four pin sites.     Patient met Phase II discharge criteria.  Baseline neurological status unchanged.  See discharge Dionne score and vitals.    Discharge instructions were reviewed with patient, daughter, and spouse who verbalized understanding using teach back method. A written copy of the instructions along with a steroid taper calendar was also given. Patient has follow up appointments scheduled.    Patient was accompanied to transportation by this RN.    Alma Delia Madera RN

## 2024-05-06 NOTE — PROCEDURES
Gamma Knife Radiosurgery Procedure Note      Patient: Elma Mcconnell  Date: 5/6/2024    Diagnosis: Left posterior frontal falcine meningioma     Procedure: Gamma Knife Based Stereotactic Radiosurgery (SRS)    Description of procedure:  Elma was met at the [facility] on 5/6/2024 by the Gamma Knife Team including [Thanh Ferrari MD]    Conscious sedation and frame placement were completed by Dr. De Santiago and an MRI brain and cone beam CT were completed. These images were imported to the Gamma Plan workstation. The target and critical structures were contoured. A treatment plan was developed and dosimetry was then reviewed by Dr. De Santiago, Mykel Ross (special physics consult requested) and myself. All critical structure constraints were fulfilled. A pre-treatment cone-beam CT scan was performed in the treatment position to confirm set-up accuracy. The patient then underwent Gamma Knife radiosurgery using the following parameters:    Target Size Volume Shape Shots Dose Isodose     cm cc     Gy %    Left posterior frontal falcine meningioma 1.58 0.558 Oval 19 14 50%       Treatment was given successfully. The frame was removed without complication. The patient was discharged home in stable condition.     Thanh Ferrari MD

## 2024-05-06 NOTE — PROGRESS NOTES
Gamma Knife Radiation Delivery Time Out    1.  Patient states name and birthdate correctly? Yes    2.  Procedure listed on consent Stereotactic Radiosurgery, Gamma Knife for left posterior frontal falcine meningioma    3. Is this the correct procedure? Yes    4. Is this a trigeminal neuralgia case? No    -If yes, what side are we treating? N/A    -If yes, is the side marked for laterality?  N/A    5.  Has the final radiologist report been reviewed? yes    6.  Does the patient require Keppra prior to treatment delivery? yes    7.  Does the patient require Ativan prior to treatment delivery?  yes    8.  Are all present in agreement?  Yes    9. Those present for time out: Dr. Vega Ji, Alma Delia Madera, RN     Alma Delia Madera, RN

## 2024-05-06 NOTE — ANESTHESIA PRE PROCEDURE
Department of Anesthesiology  Preprocedure Note       Name:  Elma Mcconnell   Age:  79 y.o.  :  1945                                          MRN:  [id number]         Date:  2024      Surgeon: * No surgeons listed *    Procedure: * No procedures listed *    Medications prior to admission:   Prior to Admission medications    Medication Sig Start Date End Date Taking? Authorizing Provider   pimavanserin tartrate (NUPLAZID) 34 MG CAPS capsule Take 1 capsule by mouth in the morning and at bedtime   Yes Thao Jensen MD   dexAMETHasone (DECADRON) 4 MG tablet Take 1 tablet by mouth 2 times daily (with meals) Start 1 day before GKRS   Yes Thao Jensen MD   levETIRAcetam (KEPPRA) 500 MG tablet Take 1 tablet by mouth 2 times daily Start 3 days prior to GKRS   Yes Thao Jensen MD   pantoprazole (PROTONIX) 40 MG tablet Take 1 tablet by mouth daily Start 1 day before GKRS   Yes Thao Jensen MD   LORazepam (ATIVAN) 0.5 MG tablet Take 1 tablet by mouth in the morning, at noon, and at bedtime. Start 1 day before GKRS. Max Daily Amount: 1.5 mg   Yes Thao Jensen MD   albuterol (PROVENTIL) (2.5 MG/3ML) 0.083% nebulizer solution Take 3 mLs by nebulization every 6 hours as needed for Wheezing    Thao Jensen MD   Multiple Vitamins-Minerals (THERAPEUTIC MULTIVITAMIN-MINERALS) tablet Take 1 tablet by mouth daily  Patient not taking: Reported on 2024    Thao Jensen MD   doxazosin (CARDURA) 2 MG tablet  10/21/22   Thao Jensen MD   fluticasone (FLONASE) 50 MCG/ACT nasal spray 2 sprays by Nasal route nightly  Patient not taking: Reported on 2024   Thao Jensen MD   TRELEKAREN ELLIPTA 100-62.5-25 MCG/ACT AEPB inhaler INHALE 1 PUFF DAILY  Patient not taking: Reported on 22   Thao Jensen MD   latanoprost (XALATAN) 0.005 % ophthalmic solution Apply 1 drop to eye nightly    Thao Jensen MD   metFORMIN

## 2024-05-07 ENCOUNTER — POST-OP TELEPHONE (OUTPATIENT)
Dept: RADIATION ONCOLOGY | Age: 79
End: 2024-05-07

## 2024-05-07 NOTE — PROGRESS NOTES
Spoke to patient POD #1 from Gamma Knife radiosurgery.  Patient denies bleeding, swelling, headache, or fever.  Reinforced all post-procedure instructions and reviewed steroid taper.     Reviewed with patient that the follow-up phone call is scheduled for 5/20.     Patient verbalized understanding to call with any new neurological symptoms.     Encouraged to call with any questions or concerns.     Alma Delia Madera RN

## 2024-05-21 ENCOUNTER — POST-OP TELEPHONE (OUTPATIENT)
Dept: RADIATION ONCOLOGY | Age: 79
End: 2024-05-21

## 2024-11-14 ENCOUNTER — HOSPITAL ENCOUNTER (OUTPATIENT)
Dept: MRI IMAGING | Age: 79
Discharge: HOME | End: 2024-11-14
Attending: NEUROLOGICAL SURGERY
Payer: MEDICARE

## 2024-11-14 DIAGNOSIS — D32.9 MENINGIOMA (HCC): ICD-10-CM

## 2024-11-14 PROCEDURE — A9576 INJ PROHANCE MULTIPACK: HCPCS | Performed by: NEUROLOGICAL SURGERY

## 2024-11-14 PROCEDURE — 6360000004 HC RX CONTRAST MEDICATION: Performed by: NEUROLOGICAL SURGERY

## 2024-11-14 PROCEDURE — 70553 MRI BRAIN STEM W/O & W/DYE: CPT

## 2024-11-14 RX ADMIN — GADOTERIDOL 15 ML: 279.3 INJECTION, SOLUTION INTRAVENOUS at 19:11
